# Patient Record
Sex: MALE | Race: WHITE | NOT HISPANIC OR LATINO
[De-identification: names, ages, dates, MRNs, and addresses within clinical notes are randomized per-mention and may not be internally consistent; named-entity substitution may affect disease eponyms.]

---

## 2017-09-21 ENCOUNTER — APPOINTMENT (OUTPATIENT)
Dept: ENDOCRINOLOGY | Facility: CLINIC | Age: 62
End: 2017-09-21

## 2017-12-28 ENCOUNTER — APPOINTMENT (OUTPATIENT)
Dept: OPHTHALMOLOGY | Facility: CLINIC | Age: 62
End: 2017-12-28
Payer: COMMERCIAL

## 2017-12-28 DIAGNOSIS — H52.4 HYPERMETROPIA, BILATERAL: ICD-10-CM

## 2017-12-28 DIAGNOSIS — H52.223 HYPERMETROPIA, BILATERAL: ICD-10-CM

## 2017-12-28 DIAGNOSIS — H52.03 HYPERMETROPIA, BILATERAL: ICD-10-CM

## 2017-12-28 PROCEDURE — 92014 COMPRE OPH EXAM EST PT 1/>: CPT

## 2018-12-21 ENCOUNTER — APPOINTMENT (OUTPATIENT)
Age: 63
End: 2018-12-21

## 2019-01-18 ENCOUNTER — APPOINTMENT (OUTPATIENT)
Dept: OPHTHALMOLOGY | Facility: CLINIC | Age: 64
End: 2019-01-18
Payer: MEDICARE

## 2019-01-18 DIAGNOSIS — H26.9 UNSPECIFIED CATARACT: ICD-10-CM

## 2019-01-18 DIAGNOSIS — H43.393 OTHER VITREOUS OPACITIES, BILATERAL: ICD-10-CM

## 2019-01-18 PROCEDURE — 92014 COMPRE OPH EXAM EST PT 1/>: CPT

## 2019-08-09 ENCOUNTER — NON-APPOINTMENT (OUTPATIENT)
Age: 64
End: 2019-08-09

## 2019-08-09 ENCOUNTER — APPOINTMENT (OUTPATIENT)
Dept: OPHTHALMOLOGY | Facility: CLINIC | Age: 64
End: 2019-08-09
Payer: MEDICARE

## 2019-08-09 PROCEDURE — 92014 COMPRE OPH EXAM EST PT 1/>: CPT

## 2019-12-20 ENCOUNTER — APPOINTMENT (OUTPATIENT)
Dept: OPHTHALMOLOGY | Facility: CLINIC | Age: 64
End: 2019-12-20

## 2020-01-24 ENCOUNTER — APPOINTMENT (OUTPATIENT)
Dept: OPHTHALMOLOGY | Facility: CLINIC | Age: 65
End: 2020-01-24
Payer: MEDICARE

## 2020-01-24 ENCOUNTER — NON-APPOINTMENT (OUTPATIENT)
Age: 65
End: 2020-01-24

## 2020-01-24 PROCEDURE — 92014 COMPRE OPH EXAM EST PT 1/>: CPT

## 2020-11-27 ENCOUNTER — TRANSCRIPTION ENCOUNTER (OUTPATIENT)
Age: 65
End: 2020-11-27

## 2020-12-25 ENCOUNTER — TRANSCRIPTION ENCOUNTER (OUTPATIENT)
Age: 65
End: 2020-12-25

## 2021-01-29 ENCOUNTER — APPOINTMENT (OUTPATIENT)
Dept: OPHTHALMOLOGY | Facility: CLINIC | Age: 66
End: 2021-01-29

## 2021-10-03 ENCOUNTER — TRANSCRIPTION ENCOUNTER (OUTPATIENT)
Age: 66
End: 2021-10-03

## 2021-11-08 DIAGNOSIS — Z01.812 ENCOUNTER FOR PREPROCEDURAL LABORATORY EXAMINATION: ICD-10-CM

## 2021-11-10 ENCOUNTER — TRANSCRIPTION ENCOUNTER (OUTPATIENT)
Age: 66
End: 2021-11-10

## 2021-11-21 ENCOUNTER — TRANSCRIPTION ENCOUNTER (OUTPATIENT)
Age: 66
End: 2021-11-21

## 2021-12-29 ENCOUNTER — APPOINTMENT (OUTPATIENT)
Dept: PULMONOLOGY | Facility: CLINIC | Age: 66
End: 2021-12-29

## 2021-12-30 ENCOUNTER — NON-APPOINTMENT (OUTPATIENT)
Age: 66
End: 2021-12-30

## 2021-12-31 ENCOUNTER — TRANSCRIPTION ENCOUNTER (OUTPATIENT)
Age: 66
End: 2021-12-31

## 2022-01-03 ENCOUNTER — NON-APPOINTMENT (OUTPATIENT)
Age: 67
End: 2022-01-03

## 2022-01-04 ENCOUNTER — APPOINTMENT (OUTPATIENT)
Dept: PULMONOLOGY | Facility: CLINIC | Age: 67
End: 2022-01-04
Payer: MEDICARE

## 2022-01-04 DIAGNOSIS — R06.00 DYSPNEA, UNSPECIFIED: ICD-10-CM

## 2022-01-04 PROCEDURE — 99204 OFFICE O/P NEW MOD 45 MIN: CPT | Mod: 95

## 2022-01-04 RX ORDER — ROSUVASTATIN CALCIUM 5 MG/1
TABLET, FILM COATED ORAL
Refills: 0 | Status: ACTIVE | COMMUNITY

## 2022-01-04 RX ORDER — LOSARTAN POTASSIUM 100 MG/1
TABLET, FILM COATED ORAL
Refills: 0 | Status: ACTIVE | COMMUNITY

## 2022-01-04 RX ORDER — ALBUTEROL SULFATE 90 UG/1
108 (90 BASE) INHALANT RESPIRATORY (INHALATION)
Qty: 1 | Refills: 6 | Status: ACTIVE | COMMUNITY
Start: 2022-01-04 | End: 1900-01-01

## 2022-01-04 RX ORDER — TADALAFIL 2.5 MG/1
TABLET, FILM COATED ORAL
Refills: 0 | Status: ACTIVE | COMMUNITY

## 2022-01-04 NOTE — HISTORY OF PRESENT ILLNESS
[TextBox_4] : 65 yo M with GERD, ?asthma, hypothyroidism, HTN here to be evaluated for shortness of breath. For the past year the shortness of breath is severe. 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 Was seeing a pulmonologist at [facility] for the last 4 years and was told he had asthma and was given Symbicort which he used intermittently with no significant improvement. 1 week ago, was on prednisone because of worsening shortness of breath. \par Has severe GERD and is being considered for surgery given that symptoms of gerd are uncontrolled on PPI. Had a cardiac work up done (stress test, ECHO)\par of note: stopped PPI about 2 days ago and now is noticing a cough. \par SH: never smoker, no drug use. Works as . Has a cat and turtles as pets.

## 2022-01-04 NOTE — REASON FOR VISIT
[Initial] : an initial visit [Shortness of Breath] : shortness of breath [Home] : at home, [unfilled] , at the time of the visit. [Medical Office: ([facility])___] : at the medical office located in  [Verbal consent obtained from patient] : the patient, [unfilled]

## 2022-01-04 NOTE — PHYSICAL EXAM
[No Acute Distress] : no acute distress [No Acc Muscle Use] : no acc muscle use [Oriented x3] : oriented x3 [Normal Affect] : normal affect [TextBox_2] : limited exam given teleheatlh

## 2022-05-04 ENCOUNTER — APPOINTMENT (OUTPATIENT)
Dept: PULMONOLOGY | Facility: CLINIC | Age: 67
End: 2022-05-04

## 2022-06-03 ENCOUNTER — APPOINTMENT (OUTPATIENT)
Dept: PULMONOLOGY | Facility: CLINIC | Age: 67
End: 2022-06-03
Payer: MEDICARE

## 2022-06-03 ENCOUNTER — NON-APPOINTMENT (OUTPATIENT)
Age: 67
End: 2022-06-03

## 2022-06-03 VITALS
WEIGHT: 147 LBS | BODY MASS INDEX: 26.05 KG/M2 | RESPIRATION RATE: 14 BRPM | HEART RATE: 65 BPM | DIASTOLIC BLOOD PRESSURE: 68 MMHG | HEIGHT: 63 IN | TEMPERATURE: 98.4 F | OXYGEN SATURATION: 96 % | SYSTOLIC BLOOD PRESSURE: 113 MMHG

## 2022-06-03 DIAGNOSIS — G47.00 INSOMNIA, UNSPECIFIED: ICD-10-CM

## 2022-06-03 LAB — POCT - HEMOGLOBIN (HGB), QUANTITATIVE, TRANSCUTANEOUS: 13.4

## 2022-06-03 PROCEDURE — 94727 GAS DIL/WSHOT DETER LNG VOL: CPT

## 2022-06-03 PROCEDURE — 95012 NITRIC OXIDE EXP GAS DETER: CPT

## 2022-06-03 PROCEDURE — 94729 DIFFUSING CAPACITY: CPT

## 2022-06-03 PROCEDURE — 99205 OFFICE O/P NEW HI 60 MIN: CPT | Mod: 25

## 2022-06-03 PROCEDURE — 94060 EVALUATION OF WHEEZING: CPT

## 2022-06-03 PROCEDURE — 88738 HGB QUANT TRANSCUTANEOUS: CPT

## 2022-06-03 RX ORDER — AMLODIPINE BESYLATE 5 MG/1
TABLET ORAL
Refills: 0 | Status: DISCONTINUED | COMMUNITY
End: 2022-06-03

## 2022-06-03 RX ORDER — OMEPRAZOLE 40 MG/1
40 CAPSULE, DELAYED RELEASE ORAL
Refills: 0 | Status: ACTIVE | COMMUNITY

## 2022-06-03 RX ORDER — AMLODIPINE BESYLATE 10 MG/1
10 TABLET ORAL
Refills: 0 | Status: ACTIVE | COMMUNITY

## 2022-06-03 RX ORDER — TAMSULOSIN HYDROCHLORIDE 0.4 MG/1
0.4 CAPSULE ORAL
Refills: 0 | Status: ACTIVE | COMMUNITY

## 2022-06-03 RX ORDER — ASPIRIN ENTERIC COATED TABLETS 81 MG 81 MG/1
81 TABLET, DELAYED RELEASE ORAL
Refills: 0 | Status: ACTIVE | COMMUNITY

## 2022-06-03 RX ORDER — FLUTICASONE FUROATE AND VILANTEROL TRIFENATATE 200; 25 UG/1; UG/1
200-25 POWDER RESPIRATORY (INHALATION)
Refills: 0 | Status: ACTIVE | COMMUNITY

## 2022-06-03 RX ORDER — FLUTICASONE FUROATE AND VILANTEROL TRIFENATATE 100; 25 UG/1; UG/1
100-25 POWDER RESPIRATORY (INHALATION) DAILY
Qty: 1 | Refills: 5 | Status: ACTIVE | COMMUNITY
Start: 2022-06-03 | End: 1900-01-01

## 2022-06-03 NOTE — HISTORY OF PRESENT ILLNESS
[Never] : never [TextBox_4] : JUAN MANUEL PALOMINO is a 67 year old  M referred for pulmonary evaluation for sleep issues\par \par Seeing MD at [facility].\par Presently on CPAP most nights.\par Sleeps 4-5 hours/night.\par Not tired.\par Some Am fatigue.\par Often goes to sleep and wakes in 1 hour and some difficulty falling back to sleep.\par Tried multiple medications. Not clear on names. Very sensitive to meds. Tried Melatonin recently with some response. \par Does respond to very low dose of Klonopin. Takes 1/4 of .5 mg. \par Past winter had significant SOB. Saw pulmonary.\par Ultimately dx. with severe GERD and aspiration.\par Now improved and back to exercise.\par Presently feels resp. status is pretty good. \par \par Past pulmonary history. N\par Occupational Exposure. N\par Family history of pulmonary disease. N\par Recent travel  N\par Pets Cat not allergic\par \par Had CT 3-4 months ago told to repeat this summer [TextBox_29] : No second hand tobacco

## 2022-06-03 NOTE — ASSESSMENT
[FreeTextEntry1] : Decrease Breo to 100\par Obtain CT films and report\par Trial of continued melatonin\par Can occasionally take Klonipin\par Continue CPAP.\par Follow-up in 3 months or sooner on a as needed basis.\par \par 65 minutes spent in evaluation management and review of studies.

## 2022-06-03 NOTE — PROCEDURE
[FreeTextEntry1] : 06/03/2022\par Pulmonary function testing\par These data demonstrate a mild obstructive ventilatory deficit. There is elevation in the RV/TLC ratio indicative of possible air trapping. There is a mild diffusion impairment.

## 2022-06-03 NOTE — DISCUSSION/SUMMARY
[FreeTextEntry1] : JOON doing relatively well on CPAP therapy.\par Insomnia remains an issue for this patient.\par Component OAD.  No definitive reversibility but historically improved on Breo Ellipta.\par History of abnormal CT.\par

## 2022-06-03 NOTE — PHYSICAL EXAM
[No Acute Distress] : no acute distress [Normal Oropharynx] : normal oropharynx [Normal Appearance] : normal appearance [No Neck Mass] : no neck mass [Normal Rate/Rhythm] : normal rate/rhythm [Normal S1, S2] : normal s1, s2 [No Murmurs] : no murmurs [No Resp Distress] : no resp distress [Clear to Auscultation Bilaterally] : clear to auscultation bilaterally [Normal to Percussion] : normal to percussion [No Abnormalities] : no abnormalities [Benign] : benign [Normal Gait] : normal gait [No Clubbing] : no clubbing [No Cyanosis] : no cyanosis [No Edema] : no edema [FROM] : FROM [Normal Color/ Pigmentation] : normal color/ pigmentation [No Focal Deficits] : no focal deficits [Oriented x3] : oriented x3 [Normal Affect] : normal affect

## 2022-08-26 ENCOUNTER — APPOINTMENT (OUTPATIENT)
Dept: PULMONOLOGY | Facility: CLINIC | Age: 67
End: 2022-08-26

## 2022-08-26 VITALS
OXYGEN SATURATION: 98 % | SYSTOLIC BLOOD PRESSURE: 134 MMHG | DIASTOLIC BLOOD PRESSURE: 78 MMHG | HEART RATE: 61 BPM | TEMPERATURE: 98.1 F

## 2022-08-26 DIAGNOSIS — K21.9 GASTRO-ESOPHAGEAL REFLUX DISEASE W/OUT ESOPHAGITIS: ICD-10-CM

## 2022-08-26 DIAGNOSIS — R91.8 OTHER NONSPECIFIC ABNORMAL FINDING OF LUNG FIELD: ICD-10-CM

## 2022-08-26 DIAGNOSIS — R06.02 SHORTNESS OF BREATH: ICD-10-CM

## 2022-08-26 DIAGNOSIS — G47.33 OBSTRUCTIVE SLEEP APNEA (ADULT) (PEDIATRIC): ICD-10-CM

## 2022-08-26 PROCEDURE — 95012 NITRIC OXIDE EXP GAS DETER: CPT

## 2022-08-26 PROCEDURE — 94010 BREATHING CAPACITY TEST: CPT

## 2022-08-26 PROCEDURE — 99214 OFFICE O/P EST MOD 30 MIN: CPT | Mod: 25

## 2022-08-26 RX ORDER — MIRTAZAPINE 7.5 MG/1
7.5 TABLET, FILM COATED ORAL
Qty: 30 | Refills: 5 | Status: ACTIVE | COMMUNITY
Start: 2022-08-26 | End: 1900-01-01

## 2022-08-27 NOTE — PROCEDURE
[FreeTextEntry1] : 08/26/2022\par Pulmonary function testing\par These data demonstrate a mild obstructive ventilatory deficit. \par Mild decrease in flow rates compared to So[place]ila 3, 2022.\par \par CT Strum  describes imp. in ground glass opacities. \par Area of bronchiectasis. \par Stable 6 mm nodule\par Diffuse bronchial wall thickening.

## 2022-08-27 NOTE — DISCUSSION/SUMMARY
[FreeTextEntry1] : JOON doing relatively well on CPAP therapy.\par Insomnia remains an issue for this patient.\par Component OAD.  No definitive reversibility but historically improved on Breo Ellipta.\par Abnormal CT.\par

## 2022-08-27 NOTE — ASSESSMENT
[FreeTextEntry1] :  Continue Breo 100\par Consider change to inhaled steroid alone if remains well through winter. \par Obtain CT films\par Trial of Remeron 3.75 for sleep.\par Continue CPAP.\par Follow-up in 3 months or sooner on a as needed basis.\par \par 35 minutes spent in evaluation management and review of studies.

## 2022-08-27 NOTE — HISTORY OF PRESENT ILLNESS
[Never] : never [TextBox_4] : Does not feel well next day with melatonin. \par Seeing pulmonary  in city for pulmonary issues. \par Feeling better on PPI.\par On CPAP. Often wakes up and stops using it. \par Takes rare Klonipin for sleep.\par Some fatigue late in afternoon.\par Brought CT.\par Doing better with lower dose Breo. \par Was started on Breo for cough and SOB. \par \par By history poor response to Trazadone\par believes had Remeron many years ago.  [TextBox_29] : No second hand tobacco

## 2022-11-29 ENCOUNTER — APPOINTMENT (OUTPATIENT)
Dept: PULMONOLOGY | Facility: CLINIC | Age: 67
End: 2022-11-29

## 2023-01-15 ENCOUNTER — NON-APPOINTMENT (OUTPATIENT)
Age: 68
End: 2023-01-15

## 2023-08-29 ENCOUNTER — NON-APPOINTMENT (OUTPATIENT)
Age: 68
End: 2023-08-29

## 2024-01-02 ENCOUNTER — NON-APPOINTMENT (OUTPATIENT)
Age: 69
End: 2024-01-02

## 2024-01-22 ENCOUNTER — APPOINTMENT (OUTPATIENT)
Dept: OPHTHALMOLOGY | Facility: CLINIC | Age: 69
End: 2024-01-22
Payer: MEDICARE

## 2024-01-22 ENCOUNTER — NON-APPOINTMENT (OUTPATIENT)
Age: 69
End: 2024-01-22

## 2024-01-22 PROCEDURE — 92250 FUNDUS PHOTOGRAPHY W/I&R: CPT

## 2024-01-22 PROCEDURE — 92015 DETERMINE REFRACTIVE STATE: CPT

## 2024-01-22 PROCEDURE — 92004 COMPRE OPH EXAM NEW PT 1/>: CPT

## 2024-11-21 NOTE — ASSESSMENT
[FreeTextEntry1] : Impression/Plan: \par 1. Dyspnea on minimal exertion. per patient stress test was negative within the last year. he has been diagnosed with asthma but i don’t believe the profound shortness of breath can be explained by his asthma\par - he wll send me results of the CXR done in  (normal per patient), recent lab test and cardiac work up\par - will get ECHO and CT chest with contrast \par \par 2. Asthma \par - was given symbicrot whichhe used infrequently since it did not provide relief. can stop using the same\par - start albuterol PRN\par \par 3. Cough likely ?2 uncontrolled GERD\par - he will be resuming his PPI as recommended by his gastroenterologist \par \par 
weight-bearing as tolerated

## 2025-05-08 ENCOUNTER — APPOINTMENT (OUTPATIENT)
Dept: OPHTHALMOLOGY | Facility: CLINIC | Age: 70
End: 2025-05-08
Payer: MEDICARE

## 2025-05-08 ENCOUNTER — NON-APPOINTMENT (OUTPATIENT)
Age: 70
End: 2025-05-08

## 2025-05-08 PROCEDURE — 92014 COMPRE OPH EXAM EST PT 1/>: CPT

## 2025-05-08 PROCEDURE — 92250 FUNDUS PHOTOGRAPHY W/I&R: CPT
